# Patient Record
Sex: MALE | Race: WHITE | URBAN - METROPOLITAN AREA
[De-identification: names, ages, dates, MRNs, and addresses within clinical notes are randomized per-mention and may not be internally consistent; named-entity substitution may affect disease eponyms.]

---

## 2021-10-08 ENCOUNTER — OFFICE VISIT (OUTPATIENT)
Dept: URBAN - METROPOLITAN AREA CLINIC 23 | Facility: CLINIC | Age: 36
End: 2021-10-08
Payer: COMMERCIAL

## 2021-10-08 ENCOUNTER — WEB ENCOUNTER (OUTPATIENT)
Dept: URBAN - METROPOLITAN AREA CLINIC 23 | Facility: CLINIC | Age: 36
End: 2021-10-08

## 2021-10-08 ENCOUNTER — TELEPHONE ENCOUNTER (OUTPATIENT)
Dept: URBAN - METROPOLITAN AREA CLINIC 92 | Facility: CLINIC | Age: 36
End: 2021-10-08

## 2021-10-08 ENCOUNTER — DASHBOARD ENCOUNTERS (OUTPATIENT)
Age: 36
End: 2021-10-08

## 2021-10-08 DIAGNOSIS — R17 ELEVATED BILIRUBIN: ICD-10-CM

## 2021-10-08 PROCEDURE — 99203 OFFICE O/P NEW LOW 30 MIN: CPT | Performed by: STUDENT IN AN ORGANIZED HEALTH CARE EDUCATION/TRAINING PROGRAM

## 2021-10-08 NOTE — HPI-TODAY'S VISIT:
36 yo M here for evaluation for elevated biliurbin. Labs reviewed from 8/2021 total bilirubin 2.4, direct bilirubin 0.4 Acute hep panel negative No other liver enzymes available. Denies symptoms. No FH of liver disease.  RUQ US unremarkable

## 2021-10-09 LAB
A/G RATIO: 2.5
ALBUMIN: 5.3
ALKALINE PHOSPHATASE: 67
ALT (SGPT): 35
AST (SGOT): 23
BASO (ABSOLUTE): 0
BASOS: 1
BILIRUBIN, DIRECT: 0.26
BILIRUBIN, TOTAL: 1.3
BUN/CREATININE RATIO: 16
BUN: 16
CALCIUM: 9.9
CARBON DIOXIDE, TOTAL: 25
CHLORIDE: 101
CREATININE: 1
EGFR IF AFRICN AM: 113
EGFR IF NONAFRICN AM: 98
EOS (ABSOLUTE): 0.1
EOS: 2
GLOBULIN, TOTAL: 2.1
GLUCOSE: 95
HEMATOCRIT: 47.6
HEMATOLOGY COMMENTS:: (no result)
HEMOGLOBIN: 15.4
IMMATURE CELLS: (no result)
IMMATURE GRANS (ABS): 0
IMMATURE GRANULOCYTES: 0
LYMPHS (ABSOLUTE): 1.5
LYMPHS: 38
MCH: 30.6
MCHC: 32.4
MCV: 95
MONOCYTES(ABSOLUTE): 0.4
MONOCYTES: 9
NEUTROPHILS (ABSOLUTE): 2
NEUTROPHILS: 50
NRBC: (no result)
PLATELETS: 187
POTASSIUM: 4.5
PROTEIN, TOTAL: 7.4
RBC: 5.03
RDW: 12.1
SODIUM: 140
WBC: 4

## 2021-10-11 ENCOUNTER — TELEPHONE ENCOUNTER (OUTPATIENT)
Dept: URBAN - METROPOLITAN AREA CLINIC 92 | Facility: CLINIC | Age: 36
End: 2021-10-11